# Patient Record
Sex: MALE | Race: BLACK OR AFRICAN AMERICAN | NOT HISPANIC OR LATINO | Employment: UNEMPLOYED | URBAN - METROPOLITAN AREA
[De-identification: names, ages, dates, MRNs, and addresses within clinical notes are randomized per-mention and may not be internally consistent; named-entity substitution may affect disease eponyms.]

---

## 2018-12-24 ENCOUNTER — HOSPITAL ENCOUNTER (EMERGENCY)
Facility: OTHER | Age: 53
Discharge: HOME | End: 2018-12-24
Attending: EMERGENCY MEDICINE
Payer: MEDICAID

## 2018-12-24 VITALS
OXYGEN SATURATION: 97 % | TEMPERATURE: 98 F | WEIGHT: 230 LBS | HEIGHT: 70 IN | DIASTOLIC BLOOD PRESSURE: 117 MMHG | BODY MASS INDEX: 32.93 KG/M2 | HEART RATE: 100 BPM | SYSTOLIC BLOOD PRESSURE: 183 MMHG | RESPIRATION RATE: 18 BRPM

## 2018-12-24 DIAGNOSIS — R03.0 ELEVATED BLOOD PRESSURE READING: Primary | ICD-10-CM

## 2018-12-24 DIAGNOSIS — K13.79 DISORDER OF UVULA: ICD-10-CM

## 2018-12-24 LAB
ALBUMIN SERPL BCP-MCNC: 4.2 G/DL
ALP SERPL-CCNC: 61 U/L
ALT SERPL W/O P-5'-P-CCNC: 36 U/L
ANION GAP SERPL CALC-SCNC: 11 MMOL/L
AST SERPL-CCNC: 23 U/L
BASOPHILS # BLD AUTO: 0.03 K/UL
BASOPHILS NFR BLD: 0.4 %
BILIRUB SERPL-MCNC: 0.6 MG/DL
BUN SERPL-MCNC: 14 MG/DL
CALCIUM SERPL-MCNC: 10 MG/DL
CHLORIDE SERPL-SCNC: 105 MMOL/L
CO2 SERPL-SCNC: 24 MMOL/L
CREAT SERPL-MCNC: 1 MG/DL
DEPRECATED S PYO AG THROAT QL EIA: NEGATIVE
DIFFERENTIAL METHOD: ABNORMAL
EOSINOPHIL # BLD AUTO: 0.2 K/UL
EOSINOPHIL NFR BLD: 2.4 %
ERYTHROCYTE [DISTWIDTH] IN BLOOD BY AUTOMATED COUNT: 12.2 %
EST. GFR  (AFRICAN AMERICAN): >60 ML/MIN/1.73 M^2
EST. GFR  (NON AFRICAN AMERICAN): >60 ML/MIN/1.73 M^2
GLUCOSE SERPL-MCNC: 97 MG/DL
HCT VFR BLD AUTO: 38.2 %
HGB BLD-MCNC: 13.6 G/DL
LYMPHOCYTES # BLD AUTO: 2.5 K/UL
LYMPHOCYTES NFR BLD: 37.6 %
MCH RBC QN AUTO: 31.8 PG
MCHC RBC AUTO-ENTMCNC: 35.6 G/DL
MCV RBC AUTO: 89 FL
MONOCYTES # BLD AUTO: 0.5 K/UL
MONOCYTES NFR BLD: 7.6 %
NEUTROPHILS # BLD AUTO: 3.5 K/UL
NEUTROPHILS NFR BLD: 51.3 %
PLATELET # BLD AUTO: 216 K/UL
PMV BLD AUTO: 9.5 FL
POTASSIUM SERPL-SCNC: 4 MMOL/L
PROT SERPL-MCNC: 7.7 G/DL
RBC # BLD AUTO: 4.28 M/UL
SODIUM SERPL-SCNC: 140 MMOL/L
WBC # BLD AUTO: 6.73 K/UL

## 2018-12-24 PROCEDURE — 85025 COMPLETE CBC W/AUTO DIFF WBC: CPT

## 2018-12-24 PROCEDURE — 63600175 PHARM REV CODE 636 W HCPCS: Performed by: EMERGENCY MEDICINE

## 2018-12-24 PROCEDURE — 87081 CULTURE SCREEN ONLY: CPT

## 2018-12-24 PROCEDURE — 87880 STREP A ASSAY W/OPTIC: CPT

## 2018-12-24 PROCEDURE — 25000003 PHARM REV CODE 250: Performed by: EMERGENCY MEDICINE

## 2018-12-24 PROCEDURE — 80053 COMPREHEN METABOLIC PANEL: CPT

## 2018-12-24 PROCEDURE — 99284 EMERGENCY DEPT VISIT MOD MDM: CPT

## 2018-12-24 RX ORDER — DIPHENHYDRAMINE HYDROCHLORIDE 50 MG/ML
12.5 INJECTION INTRAMUSCULAR; INTRAVENOUS
Status: COMPLETED | OUTPATIENT
Start: 2018-12-24 | End: 2018-12-24

## 2018-12-24 RX ORDER — HYDROCHLOROTHIAZIDE 25 MG/1
25 TABLET ORAL DAILY
Qty: 30 TABLET | Refills: 0 | Status: SHIPPED | OUTPATIENT
Start: 2018-12-24 | End: 2019-01-23

## 2018-12-24 RX ORDER — DIPHENHYDRAMINE HCL 25 MG
25 CAPSULE ORAL EVERY 6 HOURS PRN
Qty: 10 CAPSULE | Refills: 0 | Status: SHIPPED | OUTPATIENT
Start: 2018-12-24 | End: 2018-12-26

## 2018-12-24 RX ORDER — FAMOTIDINE 20 MG/1
20 TABLET, FILM COATED ORAL
Status: COMPLETED | OUTPATIENT
Start: 2018-12-24 | End: 2018-12-24

## 2018-12-24 RX ORDER — PREDNISONE 20 MG/1
40 TABLET ORAL DAILY
Qty: 4 TABLET | Refills: 0 | Status: SHIPPED | OUTPATIENT
Start: 2018-12-24 | End: 2018-12-26

## 2018-12-24 RX ORDER — DEXAMETHASONE SODIUM PHOSPHATE 4 MG/ML
12 INJECTION, SOLUTION INTRA-ARTICULAR; INTRALESIONAL; INTRAMUSCULAR; INTRAVENOUS; SOFT TISSUE
Status: COMPLETED | OUTPATIENT
Start: 2018-12-24 | End: 2018-12-24

## 2018-12-24 RX ADMIN — FAMOTIDINE 20 MG: 20 TABLET, FILM COATED ORAL at 12:12

## 2018-12-24 RX ADMIN — DEXAMETHASONE SODIUM PHOSPHATE 12 MG: 4 INJECTION, SOLUTION INTRAMUSCULAR; INTRAVENOUS at 11:12

## 2018-12-24 RX ADMIN — DIPHENHYDRAMINE HYDROCHLORIDE 12.5 MG: 50 INJECTION, SOLUTION INTRAMUSCULAR; INTRAVENOUS at 12:12

## 2018-12-24 NOTE — ED NOTES
Yonathan GONSALVES, aware of BP of 183/117. OK to D/C at this time. Pt asymptomatic, no HA, N/V, facial droop, unilateral weakness noted.

## 2018-12-24 NOTE — ED NOTES
"Patient Identifiers for Jordan Germain III checked and correct  Pt reports "swollen uvula for a couple of days"  Oral airway intact, no dysphagia noted  LOC: The patient is awake, alert and aware of environment with an appropriate affect, the patient is oriented x 3 and speaking appropriate.  APPEARANCE: Patient resting comfortably and in no acute distress, patient is clean and well groomed, patient's clothing is properly fastened.  SKIN: The skin is warm and dry, patient has normal skin turgor and moist mucus membranes,no rashes or lesions.Skin Intact , No Breakdown Noted  Musculoskeletal :  Normal range of motion noted. Moves all extremeties well, No swelling or tenderness noted  RESPIRATORY: Airway is open and patent, respirations are spontaneous, patient has a normal effort and rate.Bilateral Lungs Sounds are clear  CARDIAC: Patient has a normal rate and rhythm, no periphreal edema noted, capillary refill < 3 seconds.   ABDOMEN: Soft and non tender to palpation, no distention noted.   PULSES: 2+  And symmetrical in all extremeties  NEUROLOGIC: PERRL, . facial expression is symmetrical, patient moving all extremities, normal sensation in all extremities when touched with a finger.The patient is awake, alert and cooperative with a calm affect, patient is aware of environment.    Will continue to monitor    "

## 2018-12-24 NOTE — ED PROVIDER NOTES
"Encounter Date: 12/24/2018    SCRIBE #1 NOTE: I, Josephine Sanchezmayehu, am scribing for, and in the presence of, Dr. Mcmanus.       History     Chief Complaint   Patient presents with    Oral Swelling     Pt states, "I have a uvula issue, Dee Dee had it in the past." Pt denies difficulty breathing, or SOB, speaks in clear, fluent sentences.      Time seen by provider: 11:23 AM    This is a 53 y.o. male who presents with complaint of uvular swelling, which he noticed upon waking 1.5 hours ago. He states it feels like it's "elongated." Pt states that it is uncomfortable to eat and drink, and that "it feels like it's something stuck in my throat." He is able to tolerate secretions. He has no pain with swallowing. He has no difficulty breathing, though admits his breathing is "different." Sx have been constant since onset. Discomfort is exacerbated when laying flat. Pt reports similar sx about three years ago with uvular elongation and swelling. He states that it resolved upon receiving a shot, though pt is unsure which medication. Pt denies any fever, chills, facial swelling, tongue swelling, voice change, choking, SOB, lightheadedness, and dizziness. Pt is not on any daily or new medications. He reports no new foods, perfumes, or detergents. No new products. Pt does admit he is supposed to be on antihypertensives, but is not complaint. Pt is not immunosuppressed. He has NKDA. Of note, pt's daughter was seen in an ED yesterday due to fever and possible strep; she was discharged on abx. Pt's daughter did not have any sore throat.      The history is provided by the patient.     Review of patient's allergies indicates:  Allergies not on file  History reviewed. No pertinent past medical history.  History reviewed. No pertinent surgical history.  History reviewed. No pertinent family history.  Social History     Tobacco Use    Smoking status: Never Smoker    Smokeless tobacco: Never Used   Substance Use Topics    Alcohol use: No "     Frequency: Never    Drug use: No     Review of Systems   Constitutional: Negative for chills and fever.   HENT: Negative for congestion, facial swelling, rhinorrhea, sore throat, trouble swallowing and voice change.         Positive for uvular swelling and discomfort with swallowing. Negative for tongue swelling.   Respiratory: Negative for cough, choking and shortness of breath.    Cardiovascular: Negative for chest pain.   Gastrointestinal: Negative for abdominal pain, diarrhea, nausea and vomiting.   Endocrine: Negative for polyuria.   Genitourinary: Negative for decreased urine volume and dysuria.   Musculoskeletal: Negative for back pain.   Skin: Negative for rash.   Allergic/Immunologic: Negative for immunocompromised state.   Neurological: Negative for dizziness, weakness and light-headedness.   Hematological: Does not bruise/bleed easily.   Psychiatric/Behavioral: Negative for confusion.       Physical Exam     Initial Vitals [12/24/18 1120]   BP Pulse Resp Temp SpO2   (!) 201/109 92 18 98.3 °F (36.8 °C) 98 %      MAP       --         Physical Exam    Nursing note and vitals reviewed.  Constitutional: He appears well-developed and well-nourished. He is not diaphoretic. No distress.   HENT:   Head: Normocephalic and atraumatic.   Right Ear: External ear normal.   Left Ear: External ear normal.   Mouth/Throat: No trismus in the jaw. No posterior oropharyngeal erythema.   Floor of mouth is soft.     Uvula does not seem abnormally swollen.  It is not red at all.  It does not seem edematous.  Airway is patent there is no drooling there is no distress      1:32 PM on my last re-evaluation about 10 min ago patient did report feeling better and his uvula appears to have no enlargement.   Eyes: Right eye exhibits no discharge. Left eye exhibits no discharge.   Neck: Normal range of motion. Neck supple.   Cardiovascular: Normal rate, regular rhythm and normal heart sounds.   Pulmonary/Chest: Breath sounds normal.  No respiratory distress. He has no wheezes. He has no rhonchi. He has no rales.   Abdominal: Soft. Bowel sounds are normal. He exhibits no distension. There is no tenderness.   Musculoskeletal: Normal range of motion. He exhibits no edema or tenderness.   No leg swelling or pain.   Lymphadenopathy:     He has no cervical adenopathy.   Neurological: He is alert and oriented to person, place, and time. He has normal strength. No sensory deficit.   Skin: Skin is warm and dry. No rash noted. No erythema.   Psychiatric: He has a normal mood and affect. His behavior is normal. Judgment and thought content normal. His speech is rapid and/or pressured.         ED Course   Procedures  Labs Reviewed   CBC W/ AUTO DIFFERENTIAL - Abnormal; Notable for the following components:       Result Value    RBC 4.28 (*)     Hemoglobin 13.6 (*)     Hematocrit 38.2 (*)     MCH 31.8 (*)     All other components within normal limits   THROAT SCREEN, RAPID   CULTURE, STREP A,  THROAT   COMPREHENSIVE METABOLIC PANEL          Imaging Results          X-Ray Neck Soft Tissue (In process)               X-Rays:   Independently Interpreted Readings:   Other Readings:  X-Ray Neck Soft Tissue: No acute findings visualized. Airway patent.     Medical Decision Making:   Independently Interpreted Test(s):   I have ordered and independently interpreted X-rays - see prior notes.  Clinical Tests:   Lab Tests: Ordered and Reviewed  Radiological Study: Ordered and Reviewed  ED Management:  12:14 PM - Upon reassessment, pt reports not much change in sx. NAD.  12:39 PM - Pt's states that he may feel a bit better, but is asking for a drink to better assess. Pt's voice sounds better to me.  1:20 PM - Pt is feeling better.    Additional MDM:   Comments: 53-year-old male with chief complaint of uvula feeling swollen that started at 9:00 a.m. this morning when he woke.  He denies any actual lip tongue or in her mouth swelling. He denies throat pain. He says this  has happened 1 time in the past before several years ago and improved with steroids.  He has no new exposure or contacts to medication or pets or anything that he can recall.  I considered uveitis, allergic reaction, abscess of his tonsils and posterior pharynx a peritonsillar cellulitis, tooth abscess, angioedema, epiglottitis,.  Patient improved with steroids.  In addition his blood pressure was elevated.  He did seem anxious but with elevated enough to the point that I will discharge him on hydrochlorothiazide with a repeat blood pressure check in 2-3 days.  Patient's uvula but did not seem to be abnormally swollen.  For this reason I will follow him up with ENT and in case it was an allergic reaction discharge him on 2 days of therapy for allergic reaction.  During his observation period here he seemed to improve subjectively.  He did not come in in any distress.  In addition since he 1st noted the symptoms at 9:00 a.m. they have not gotten any worse prior to his arrival to the ED..          Scribe Attestation:   Scribe #1: I performed the above scribed service and the documentation accurately describes the services I performed. I attest to the accuracy of the note.    Attending Attestation:           Physician Attestation for Scribe:  Physician Attestation Statement for Scribe #1: I, Dr. Mcmanus, reviewed documentation, as scribed by Josephine Black in my presence, and it is both accurate and complete.                    Clinical Impression:     1. Disorder of uvula                                 Lupe Mcmanus MD  12/24/18 3759

## 2018-12-26 LAB — BACTERIA THROAT CULT: NORMAL
